# Patient Record
Sex: MALE | Race: WHITE | NOT HISPANIC OR LATINO | Employment: STUDENT
[De-identification: names, ages, dates, MRNs, and addresses within clinical notes are randomized per-mention and may not be internally consistent; named-entity substitution may affect disease eponyms.]

---

## 2023-11-13 ENCOUNTER — TELEPHONE (OUTPATIENT)
Dept: INTERNAL MEDICINE | Facility: OTHER | Age: 23
End: 2023-11-13

## 2023-11-13 NOTE — TELEPHONE ENCOUNTER
Giulia from [facility] called on behalf of patient stating there will be lab results faxed to our office and they would like to know if we will reconsider patient to be seen by Dr. Stein due to their new lab results.